# Patient Record
Sex: MALE | Race: WHITE | HISPANIC OR LATINO | URBAN - METROPOLITAN AREA
[De-identification: names, ages, dates, MRNs, and addresses within clinical notes are randomized per-mention and may not be internally consistent; named-entity substitution may affect disease eponyms.]

---

## 2017-09-28 ENCOUNTER — APPOINTMENT (RX ONLY)
Dept: URBAN - METROPOLITAN AREA CLINIC 75 | Facility: CLINIC | Age: 22
Setting detail: DERMATOLOGY
End: 2017-09-28

## 2017-09-28 VITALS — WEIGHT: 158 LBS | HEIGHT: 66 IN

## 2017-09-28 DIAGNOSIS — B86 SCABIES: ICD-10-CM

## 2017-09-28 PROCEDURE — ? COUNSELING

## 2017-09-28 PROCEDURE — ? PRESCRIPTION

## 2017-09-28 PROCEDURE — 99202 OFFICE O/P NEW SF 15 MIN: CPT

## 2017-09-28 PROCEDURE — ? IN-HOUSE DISPENSING PHARMACY

## 2017-09-28 RX ORDER — PERMETHRIN 50 MG/G
CREAM TOPICAL QD
Qty: 2 | Refills: 3 | Status: ERX | COMMUNITY
Start: 2017-09-28

## 2017-09-28 RX ADMIN — PERMETHRIN: 50 CREAM TOPICAL at 17:08

## 2017-09-28 ASSESSMENT — LOCATION DETAILED DESCRIPTION DERM
LOCATION DETAILED: RIGHT AXILLARY VAULT
LOCATION DETAILED: 3RD WEB SPACE LEFT HAND
LOCATION DETAILED: SUPRAPUBIC SKIN
LOCATION DETAILED: 2ND WEB SPACE RIGHT HAND
LOCATION DETAILED: LEFT LATERAL ABDOMEN
LOCATION DETAILED: LEFT AXILLARY VAULT

## 2017-09-28 ASSESSMENT — LOCATION SIMPLE DESCRIPTION DERM
LOCATION SIMPLE: LEFT HAND
LOCATION SIMPLE: RIGHT HAND
LOCATION SIMPLE: GROIN
LOCATION SIMPLE: ABDOMEN
LOCATION SIMPLE: LEFT AXILLARY VAULT
LOCATION SIMPLE: RIGHT AXILLARY VAULT

## 2017-09-28 ASSESSMENT — LOCATION ZONE DERM
LOCATION ZONE: TRUNK
LOCATION ZONE: HAND
LOCATION ZONE: AXILLAE

## 2017-09-28 NOTE — PROCEDURE: IN-HOUSE DISPENSING PHARMACY
Product 43 Refills: 0
Product 21 Unit Type: grams
Product 51 Application Directions: Apply to affected nails daily for 10 months.
Product 61 Unit Type: mg
Product 5 Refills: 11
Product 4 Amount/Unit (Numbers Only): 30
Product 31 Amount/Unit (Numbers Only): 30
Product 15 Amount/Unit (Numbers Only): 60
Product 7 Price/Unit (In Dollars): 50.00
Product 35 Application Directions: Apply to affected area two times a day.
Product 42 Refills: 11
Product 31 Application Directions: Apply to affected area before moisturizer one time a day.
Name Of Product 2: Tret 0.05% Cream - 463377
Product 6 Application Directions: Apply to affected area after moisturizer one time a day.
Product 15 Refills: 6
Name Of Product 13: Clob 0.05% Cream - 132394
Product 5 Price/Unit (In Dollars): 40.00
Name Of Product 21: Imiquim 5% / Levo 1% Gel - [id number]
Render Product Pricing In Note: Yes
Product 1 Unit Type: cc's
Product 7 Application Directions: Apply to affected area one time a day.
Name Of Product 1: Sod Sulfa Body Wash - 906838
Product 11 Amount/Unit (Numbers Only): 60
Name Of Product 17: Clob 0.05% Ointment
Product 17 Price/Unit (In Dollars): 45.00
Name Of Product 15: Triam 0.1%/Calcip 0.005% Psoriasis Ointment- 253811
Product 17 Units Dispensed: 1
Name Of Product 31: Ivermec 1% / Met 1%/ Pot Azel Gel - 069115
Name Of Product 11: Clob 0.05% Solution - 076354
Name Of Product 4: Acne Gel w/ Dapsone 7.5% - 880063
Product 5 Application Directions: Apply to affected area in evening after moisturizer. Avoid eyelids.
Name Of Product 35: Tacro 0.1% Ointment - 606342
Name Of Product 12: Iodoquin / Brandin Combo - 618769
Product 14 Amount/Unit (Numbers Only): 50
Name Of Product 16: Tacro 0.1% ointment-725519
Product 1 Application Directions: Use as face or body wash daily.
Product 3 Application Directions: Apply to acne prone area after moisturizer one time a day.
Product 2 Application Directions: Apply to affected area in the evening after moisturizer.  Avoid eyelids.
Name Of Product 14: Clob 0.05% Dermatitis Topical Foam - 745522
Product 8 Application Directions: Apply to affected area in the evening after moisturizer.  Avoid eyelids.
Name Of Product 6: Adap  0.3%/BPO Combo Cream - 533569
Name Of Product 5: Clind / Tret Combo Cream - 718732
Name Of Product 3: Carlos / Clind Combo - 647655
Product 21 Application Directions: Apply to affected area in the evening or every other evening.
Product 41 Application Directions: Apply to hyperpigmented area in the evening after moisturizer.
Detail Level: Zone
Product 17 Refills: 3
Name Of Product 51: Terb 5%/ DMSO Anti- Fungal Nail Solution - 112705
Product 14 Application Directions: Apply to affected area one to two times a day.
Name Of Product 41: Hydroquin 6%/ Tret 0.05% Combo Cream - 199502
Product 15 Application Directions: apply to affected area bid
Product 1 Amount/Unit (Numbers Only): 120
Name Of Product 7: Sod Sulf 10% / Sulf 2%  cream- 086773
Name Of Product 42: Kojic Melasma Cream - 343457
Product 51 Amount/Unit (Numbers Only): 15
Name Of Product 8: Taza 0.1% Cream - 606406
Name Of Product 24: Triamcin 1% Ointment - 314540